# Patient Record
Sex: FEMALE | Race: WHITE
[De-identification: names, ages, dates, MRNs, and addresses within clinical notes are randomized per-mention and may not be internally consistent; named-entity substitution may affect disease eponyms.]

---

## 2017-05-03 ENCOUNTER — HOSPITAL ENCOUNTER (OUTPATIENT)
Dept: HOSPITAL 56 - MW.CHFP | Age: 52
End: 2017-05-03
Attending: FAMILY MEDICINE
Payer: COMMERCIAL

## 2017-05-03 DIAGNOSIS — I10: Primary | ICD-10-CM

## 2017-05-03 DIAGNOSIS — E03.9: ICD-10-CM

## 2017-05-03 DIAGNOSIS — R73.09: ICD-10-CM

## 2017-05-03 LAB
CHLORIDE SERPL-SCNC: 104 MMOL/L (ref 98–110)
SODIUM SERPL-SCNC: 137 MMOL/L (ref 136–146)

## 2018-02-06 ENCOUNTER — HOSPITAL ENCOUNTER (EMERGENCY)
Dept: HOSPITAL 56 - MW.ED | Age: 53
Discharge: HOME | End: 2018-02-06
Payer: COMMERCIAL

## 2018-02-06 DIAGNOSIS — Z79.899: ICD-10-CM

## 2018-02-06 DIAGNOSIS — Z88.2: ICD-10-CM

## 2018-02-06 DIAGNOSIS — H66.003: Primary | ICD-10-CM

## 2018-02-06 DIAGNOSIS — B34.9: ICD-10-CM

## 2018-02-06 LAB
CHLORIDE SERPL-SCNC: 107 MMOL/L (ref 98–110)
SODIUM SERPL-SCNC: 141 MMOL/L (ref 136–146)

## 2018-02-06 NOTE — EDM.PDOC
ED HPI GENERAL MEDICAL PROBLEM





- General


Chief Complaint: General


Stated Complaint: DIZZY, LIGHT HEADED


Time Seen by Provider: 02/06/18 18:18


Source of Information: Reports: Patient


History Limitations: Reports: No Limitations





- History of Present Illness


INITIAL COMMENTS - FREE TEXT/NARRATIVE: 





HISTORY AND PHYSICAL:





History of present illness:


Patient is a 52-year-old female who presents to the emergency room today with 

complaints of dizziness, body aches, chills 24 hours. Mom reports that 

yesterday she started to feel dizzy when she would get up quickly from her 

seat. Since that time she has had body aches and feels "hot and cold at the 

same time. She denies any chest pain, shortness of breath, abdominal pain, 

nausea, vomiting or diarrhea/constipation. She has no urinary or bowel 

complaints.


Has not received the influenza vaccine this year.





Review of systems: 


As per history of present illness and below otherwise all systems reviewed and 

negative.





Past medical history: 


As per history of present illness and as reviewed below otherwise 

noncontributory.





Surgical history: 


As per history of present illness and as reviewed below otherwise 

noncontributory.





Social history: 


No reported history of drug or alcohol abuse.





Family history: 


As per history of present illness and as reviewed below otherwise 

noncontributory.





Physical exam:


General: Well-developed and well-nourished 52-year-old female. Alert and 

oriented. Nontoxic appearing and in no acute distress.


HEENT: Atraumatic, normocephalic, pupils reactive, negative for conjunctival 

pallor or scleral icterus, mucous membranes moist, throat clear, pinkish 

tympanic membranes bilaterally with cerumen, neck supple, nontender, trachea 

midline.


Lungs: Clear to auscultation, breath sounds equal bilaterally, chest nontender.


Heart: S1S2, regular, negative for clicks, rubs, or JVD.


Abdomen: Soft, nondistended, nontender. Negative for masses or 

hepatosplenomegaly. Negative for costovertebral tenderness.


Pelvis: Stable nontender.


Genitourinary: Deferred.


Rectal: Deferred.


Extremities: Atraumatic, moves all extremities per self without difficulty or 

deficits, negative for cords or calf pain. Neurovascular unremarkable.


Neuro: Awake, alert, oriented. Cranial nerves II through XII unremarkable. 

Cerebellum unremarkable. Motor and sensory unremarkable throughout. Exam 

nonfocal.





Influenza screening was negative. Due to the fact that the mother brought 

herself and 2 children in with similar complaints of dizziness and fatigue did 

request to do a CBC, CMP and a carboxyhemoglobin. These lab values are within 

normal limits. I have explained this to the mother. It is likely the symptoms 

they are experiencing are viral. Although she does have a bilateral ear 

infection which could explain her dizziness. All treat her with Augmentin 10 

days. Symptomatic care was reviewed. She voices understanding and is agreeable 

to plan of care she denies any questions at this time.





Diagnostics:


Influenza, CBC, CMP, carboxyhemoglobin





Therapeutics:


[]





Impression: 


Otitis Media, bilateral





Plan:


1. Please take the antibiotic as directed for the ear infection (likely cause 

of your dizziness). Tylenol and/or ibuprofen as needed for pain and fever 

management. 


2. Encourage plenty of fluids to prevent dehydration. Get plenty of Rest over 

the next several days.


3. Follow-up with your primary caregiver in the next 1-2 days. Return to the ED 

as needed and as discussed.





Definitive disposition and diagnosis as appropriate pending reevaluation and 

review of above.





Duration: Day(s):


Location: Reports: Generalized





- Related Data


 Allergies











Allergy/AdvReac Type Severity Reaction Status Date / Time


 


Sulfa (Sulfonamide Allergy  Other Verified 02/06/18 18:45





Antibiotics)     











Home Meds: 


 Home Meds





Bisoprolol/Hydrochlorothiazide [Bisoprolol/HCTZ 5-6.25 MG] 1 tab PO DAILY 02/06/ 18 [History]


Levothyroxine [Synthroid] 50 mcg PO DAILY 02/06/18 [History]











Social & Family History





- Tobacco Use


Smoking Status *Q: Never Smoker


Second Hand Smoke Exposure: No





- Alcohol Use


Days Per Week of Alcohol Use: 2


Number of Drinks Per Day: 4


Total Drinks Per Week: 8





- Recreational Drug Use


Recreational Drug Use: No





ED ROS GENERAL





- Review of Systems


Review Of Systems: ROS reveals no pertinent complaints other than HPI.





ED EXAM, GENERAL





- Physical Exam


Exam: See Below (See dictation)





Course





- Vital Signs


Last Recorded V/S: 


 Last Vital Signs











Temp  98.2 F   02/06/18 18:15


 


Pulse  77   02/06/18 18:15


 


Resp  18   02/06/18 18:15


 


BP  141/76 H  02/06/18 18:15


 


Pulse Ox  96   02/06/18 18:15














- Orders/Labs/Meds


Labs: 


 Laboratory Tests











  02/06/18 02/06/18 02/06/18 Range/Units





  19:52 19:52 19:52 


 


WBC  10.20    (4.0-11.0)  K/uL


 


RBC  5.32    (4.30-5.90)  M/uL


 


Hgb  14.5    (12.0-16.0)  g/dL


 


Hct  45.3    (36.0-46.0)  %


 


MCV  85.2    (80.0-98.0)  fL


 


MCH  27.3    (27.0-32.0)  pg


 


MCHC  32.0    (31.0-37.0)  g/dL


 


RDW Std Deviation  42.8    (28.0-62.0)  fl


 


RDW Coeff of Jessi  14    (11.0-15.0)  %


 


Plt Count  292    (150-400)  K/uL


 


MPV  9.60    (7.40-12.00)  fL


 


Neut % (Auto)  79.5    (48.0-80.0)  %


 


Lymph % (Auto)  14.7 L    (16.0-40.0)  %


 


Mono % (Auto)  5.1    (0.0-15.0)  %


 


Eos % (Auto)  0.5    (0.0-7.0)  %


 


Baso % (Auto)  0.2    (0.0-1.5)  %


 


Neut # (Auto)  8.1 H    (1.4-5.7)  K/uL


 


Lymph # (Auto)  1.5    (0.6-2.4)  K/uL


 


Mono # (Auto)  0.5    (0.0-0.8)  K/uL


 


Eos # (Auto)  0.1    (0.0-0.7)  K/uL


 


Baso # (Auto)  0.0    (0.0-0.1)  K/uL


 


Nucleated RBC %  0.0    /100WBC


 


Nucleated RBCs #  0    K/uL


 


ABG Carboxyhemoglobin   2.0   (0-15)  %


 


Sodium    141  (136-146)  mmol/L


 


Potassium    4.0  (3.5-5.1)  mmol/L


 


Chloride    107  ()  mmol/L


 


Carbon Dioxide    24  (21-31)  mmol/L


 


BUN    11  (6.0-23.0)  mg/dL


 


Creatinine    1.0  (0.6-1.5)  mg/dL


 


Est Cr Clr Drug Dosing    65.45  mL/min


 


Estimated GFR (MDRD)    58.7  ml/min


 


Glucose    128 H  ()  mg/dL


 


Calcium    9.9  (8.8-10.8)  mg/dL


 


Total Bilirubin    0.3  (0.1-1.5)  mg/dL


 


AST    11  (5-40)  IU/L


 


ALT    12  (8-54)  IU/L


 


Alkaline Phosphatase    72  ()  


 


Total Protein    7.7  (6.0-8.0)  g/dL


 


Albumin    4.2  (3.5-5.0)  g/dL


 


Globulin    3.5  (2.0-3.5)  g/dL


 


Albumin/Globulin Ratio    1.2 L  (1.3-2.8)  


 


TSH 3rd Generation    1.97  (0.47-5.0)  uIU/mL











Meds: 


Medications














Discontinued Medications














Generic Name Dose Route Start Last Admin





  Trade Name Freq  PRN Reason Stop Dose Admin


 


Sodium Chloride  1,000 mls @ 999 mls/hr  02/06/18 19:21  02/06/18 19:37





  Normal Saline  IV  02/06/18 20:21  Not Given





  STAT ONE   














Departure





- Departure


Time of Disposition: 20:15


Disposition: Home, Self-Care 01


Clinical Impression: 


 Viral illness





Otitis media


Qualifiers:


 Otitis media type: suppurative Chronicity: acute Laterality: bilateral 

Recurrence: not specified as recurrent Spontaneous tympanic membrane rupture: 

without spontaneous rupture Qualified Code(s): H66.003 - Acute suppurative 

otitis media without spontaneous rupture of ear drum, bilateral








- Discharge Information


Referrals: 


Jeevan Moser MD [Primary Care Provider] - 


Forms:  ED Department Discharge


Additional Instructions: 


My general discharge


The following information is given to patients seen in the emergency department 

who are being discharged to home. This information is to outline your options 

for follow-up care. We provide all patients seen in our emergency department 

with a follow-up referral.





The need for follow-up, as well as the timing and circumstances, are variable 

depending upon the specifics of your emergency department visit.





If you don't have a primary care physician on staff, we will provide you with a 

referral. We always advise you to contact your personal physician following an 

emergency department visit to inform them of the circumstance of the visit and 

for follow-up with them and/or the need for any referrals to a consulting 

specialist.





The emergency department will also refer you to a specialist when appropriate. 

This referral assures that you have the opportunity for follow-up care with a 

specialist. All of these measure are taken in an effort to provide you with 

optimal care, which includes your follow-up.





Under all circumstances we always encourage you to contact your private 

physician who remains a resource for coordinating your care. When calling for 

follow-up care, please make the office aware that this follow-up is from your 

recent emergency room visit. If for any reason you are refused follow-up, 

please contact the [facility] Emergency 

Department at [phone] and asked to speak to the emergency department 

charge nurse.





[facility]


Primary Care


[address] [place] [id number]


Phone: [phone]


Fax: [phone]





1. Labs were all normal today. Please take the antibiotic as directed for the 

ear infection (likely cause of your dizziness). Tylenol and/or ibuprofen as 

needed for pain and fever management. 


2. Encourage plenty of fluids to prevent dehydration. Get plenty of Rest over 

the next several days.


3. Follow-up with your primary caregiver in the next 1-2 days. Return to the ED 

as needed and as discussed..